# Patient Record
Sex: MALE | Race: OTHER | NOT HISPANIC OR LATINO
[De-identification: names, ages, dates, MRNs, and addresses within clinical notes are randomized per-mention and may not be internally consistent; named-entity substitution may affect disease eponyms.]

---

## 2023-04-24 PROBLEM — Z00.00 ENCOUNTER FOR PREVENTIVE HEALTH EXAMINATION: Status: ACTIVE | Noted: 2023-04-24

## 2023-04-25 ENCOUNTER — APPOINTMENT (OUTPATIENT)
Dept: OTOLARYNGOLOGY | Facility: CLINIC | Age: 28
End: 2023-04-25

## 2023-05-02 ENCOUNTER — APPOINTMENT (OUTPATIENT)
Dept: OTOLARYNGOLOGY | Facility: CLINIC | Age: 28
End: 2023-05-02
Payer: COMMERCIAL

## 2023-05-02 VITALS
SYSTOLIC BLOOD PRESSURE: 124 MMHG | TEMPERATURE: 97.9 F | BODY MASS INDEX: 25.2 KG/M2 | HEIGHT: 71 IN | OXYGEN SATURATION: 98 % | WEIGHT: 180 LBS | DIASTOLIC BLOOD PRESSURE: 75 MMHG | HEART RATE: 44 BPM

## 2023-05-02 PROCEDURE — 99203 OFFICE O/P NEW LOW 30 MIN: CPT | Mod: 25

## 2023-05-02 PROCEDURE — 31575 DIAGNOSTIC LARYNGOSCOPY: CPT | Mod: 52

## 2023-05-09 NOTE — HISTORY OF PRESENT ILLNESS
[de-identified] : 27 years old male patient with history of Persistens throat pain and throat discomfort since childhood.  History of Left tonsillar Abscess.  Patient is present today with Chronic tonsillitis > on the left side.  Bilateral tonsil hypertrophy.  Bilateral deep cryptic tonsil.  Lingual Tonsil Hypertrophy.  Turbinate Hypertrophy.  Deviated Nasal SEptum.  Septal Spur

## 2023-05-09 NOTE — REASON FOR VISIT
[Initial Evaluation] : an initial evaluation for [FreeTextEntry2] : Persistens throat pain and throat discomfort since childhood.  History of Left tonsillar Abscess.  Patient states her level of severity is a level 10 out of 10 and it occurs constant.  Patient states nothing helps to improve or worsens his Persistens throat pain and throat discomfort since childhood.  History of Left tonsillar Abscess

## 2023-05-09 NOTE — PHYSICAL EXAM
[Normal] : tympanic membranes are normal in both ears [de-identified] : Hypertrophy.  Turbinate Hypertrophy.  Deviated Nasal SEptum.  Septal Spur [de-identified] : Bilateral tonsil hypertrophy.  Bilateral deep cryptic tonsil.  Lingual Tonsil Hypertrophy.

## 2023-05-09 NOTE — CONSULT LETTER
[Dear  ___] : Dear  [unfilled], [Consult Letter:] : I had the pleasure of evaluating your patient, [unfilled]. [Please see my note below.] : Please see my note below. [Consult Closing:] : Thank you very much for allowing me to participate in the care of this patient.  If you have any questions, please do not hesitate to contact me. [Sincerely,] : Sincerely, [FreeTextEntry3] : Yvan Estrada MD, FACS\par Professor of Otolaryngology, [employer] at [facility]\par Director, [facility], Department of Otolaryngology, [facility]\par , Head & Neck Service Line, [facility]\par

## 2023-05-09 NOTE — PROCEDURE
[Congested] : congested [Deviated to the Lt] : deviated to the left [Image(s) Captured] : image(s) captured and filed [Topical Lidocaine] : topical lidocaine [Oxymetazoline HCl] : oxymetazoline HCl [Flexible Endoscope] : examined with the flexible endoscope [Serial Number: ___] : Serial Number: [unfilled] [de-identified] : Chronic tonsillitis > on the left side.  Bilateral tonsil hypertrophy.  Bilateral deep cryptic tonsil.  Lingual Tonsil Hypertrophy.  Turbinate Hypertrophy.  Deviated Nasal SEptum.  Septal Spur

## 2023-05-17 ENCOUNTER — TRANSCRIPTION ENCOUNTER (OUTPATIENT)
Age: 28
End: 2023-05-17

## 2023-05-17 RX ORDER — METHYLPREDNISOLONE 4 MG/1
4 TABLET ORAL
Qty: 1 | Refills: 0 | Status: ACTIVE | COMMUNITY
Start: 2023-05-17 | End: 1900-01-01

## 2023-05-17 RX ORDER — LIDOCAINE HYDROCHLORIDE 20 MG/ML
2 SOLUTION ORAL; TOPICAL
Qty: 1 | Refills: 4 | Status: ACTIVE | COMMUNITY
Start: 2023-05-17 | End: 1900-01-01

## 2023-05-17 RX ORDER — ACETAMINOPHEN AND CODEINE PHOSPHATE 300; 30 MG/1; MG/1
300-30 TABLET ORAL
Qty: 25 | Refills: 0 | Status: ACTIVE | COMMUNITY
Start: 2023-05-17 | End: 1900-01-01

## 2023-05-17 RX ORDER — DOCUSATE SODIUM 100 MG/1
100 CAPSULE ORAL TWICE DAILY
Qty: 60 | Refills: 0 | Status: ACTIVE | COMMUNITY
Start: 2023-05-17 | End: 1900-01-01

## 2023-05-17 RX ORDER — AMOXICILLIN 500 MG/1
500 CAPSULE ORAL
Qty: 14 | Refills: 0 | Status: ACTIVE | COMMUNITY
Start: 2023-05-17 | End: 1900-01-01

## 2023-05-17 NOTE — ASU PATIENT PROFILE, ADULT - FALL HARM RISK - UNIVERSAL INTERVENTIONS
Bed in lowest position, wheels locked, appropriate side rails in place/Call bell, personal items and telephone in reach/Instruct patient to call for assistance before getting out of bed or chair/Non-slip footwear when patient is out of bed/[place] to call system/Physically safe environment - no spills, clutter or unnecessary equipment/Purposeful Proactive Rounding/Room/bathroom lighting operational, light cord in reach

## 2023-05-17 NOTE — ASU PATIENT PROFILE, ADULT - NS PREOP UNDERSTANDS INFO
No solid food or milk products after 12 mid-night 5/17/2023/ water no later than 6am DOS/ photo ID and insurance card/ comfortable clothing/ mother will take him home/yes

## 2023-05-18 ENCOUNTER — OUTPATIENT (OUTPATIENT)
Dept: OUTPATIENT SERVICES | Facility: HOSPITAL | Age: 28
LOS: 1 days | Discharge: HOME | End: 2023-05-18
Payer: COMMERCIAL

## 2023-05-18 ENCOUNTER — TRANSCRIPTION ENCOUNTER (OUTPATIENT)
Age: 28
End: 2023-05-18

## 2023-05-18 ENCOUNTER — RESULT REVIEW (OUTPATIENT)
Age: 28
End: 2023-05-18

## 2023-05-18 ENCOUNTER — APPOINTMENT (OUTPATIENT)
Dept: OTOLARYNGOLOGY | Facility: AMBULATORY SURGERY CENTER | Age: 28
End: 2023-05-18

## 2023-05-18 VITALS
DIASTOLIC BLOOD PRESSURE: 73 MMHG | HEART RATE: 62 BPM | SYSTOLIC BLOOD PRESSURE: 104 MMHG | TEMPERATURE: 97 F | HEIGHT: 71 IN | RESPIRATION RATE: 16 BRPM | WEIGHT: 174.83 LBS

## 2023-05-18 VITALS
RESPIRATION RATE: 16 BRPM | DIASTOLIC BLOOD PRESSURE: 71 MMHG | TEMPERATURE: 98 F | SYSTOLIC BLOOD PRESSURE: 116 MMHG | HEART RATE: 89 BPM | OXYGEN SATURATION: 100 %

## 2023-05-18 PROCEDURE — 88304 TISSUE EXAM BY PATHOLOGIST: CPT | Mod: 26

## 2023-05-18 PROCEDURE — 88300 SURGICAL PATH GROSS: CPT | Mod: 26,59

## 2023-05-18 PROCEDURE — 30520 REPAIR OF NASAL SEPTUM: CPT

## 2023-05-18 PROCEDURE — 42870 EXCISION OF LINGUAL TONSIL: CPT

## 2023-05-18 PROCEDURE — 30802 ABLATE INF TURBINATE SUBMUC: CPT

## 2023-05-18 DEVICE — LASER EZTIP E4-100MM: Type: IMPLANTABLE DEVICE | Status: FUNCTIONAL

## 2023-05-18 RX ORDER — OXYCODONE HYDROCHLORIDE 5 MG/1
5 TABLET ORAL ONCE
Refills: 0 | Status: DISCONTINUED | OUTPATIENT
Start: 2023-05-18 | End: 2023-05-18

## 2023-05-18 RX ORDER — ACETAMINOPHEN 500 MG
1000 TABLET ORAL ONCE
Refills: 0 | Status: COMPLETED | OUTPATIENT
Start: 2023-05-18 | End: 2023-05-18

## 2023-05-18 RX ORDER — SODIUM CHLORIDE 9 MG/ML
1000 INJECTION, SOLUTION INTRAVENOUS
Refills: 0 | Status: DISCONTINUED | OUTPATIENT
Start: 2023-05-18 | End: 2023-05-18

## 2023-05-18 RX ORDER — HYDROXYZINE HCL 10 MG
2 TABLET ORAL
Refills: 0 | DISCHARGE

## 2023-05-18 RX ORDER — FENTANYL CITRATE 50 UG/ML
25 INJECTION INTRAVENOUS
Refills: 0 | Status: DISCONTINUED | OUTPATIENT
Start: 2023-05-18 | End: 2023-05-18

## 2023-05-18 RX ORDER — SUCRALFATE 1 G
1 TABLET ORAL ONCE
Refills: 0 | Status: COMPLETED | OUTPATIENT
Start: 2023-05-18 | End: 2023-05-18

## 2023-05-18 RX ORDER — BENZOCAINE AND MENTHOL 5; 1 G/100ML; G/100ML
1 LIQUID ORAL ONCE
Refills: 0 | Status: COMPLETED | OUTPATIENT
Start: 2023-05-18 | End: 2023-05-18

## 2023-05-18 RX ORDER — LIDOCAINE 4 G/100G
10 CREAM TOPICAL ONCE
Refills: 0 | Status: COMPLETED | OUTPATIENT
Start: 2023-05-18 | End: 2023-05-18

## 2023-05-18 RX ADMIN — Medication 1 GRAM(S): at 12:00

## 2023-05-18 RX ADMIN — Medication 1000 MILLIGRAM(S): at 16:50

## 2023-05-18 RX ADMIN — OXYCODONE HYDROCHLORIDE 5 MILLIGRAM(S): 5 TABLET ORAL at 15:45

## 2023-05-18 RX ADMIN — BENZOCAINE AND MENTHOL 1 LOZENGE: 5; 1 LIQUID ORAL at 12:00

## 2023-05-18 RX ADMIN — OXYCODONE HYDROCHLORIDE 5 MILLIGRAM(S): 5 TABLET ORAL at 12:44

## 2023-05-18 RX ADMIN — LIDOCAINE 10 MILLILITER(S): 4 CREAM TOPICAL at 13:55

## 2023-05-18 RX ADMIN — FENTANYL CITRATE 25 MICROGRAM(S): 50 INJECTION INTRAVENOUS at 11:27

## 2023-05-18 RX ADMIN — OXYCODONE HYDROCHLORIDE 5 MILLIGRAM(S): 5 TABLET ORAL at 15:15

## 2023-05-18 RX ADMIN — FENTANYL CITRATE 25 MICROGRAM(S): 50 INJECTION INTRAVENOUS at 11:33

## 2023-05-18 RX ADMIN — Medication 400 MILLIGRAM(S): at 16:20

## 2023-05-18 RX ADMIN — OXYCODONE HYDROCHLORIDE 5 MILLIGRAM(S): 5 TABLET ORAL at 13:15

## 2023-05-18 RX ADMIN — SODIUM CHLORIDE 100 MILLILITER(S): 9 INJECTION, SOLUTION INTRAVENOUS at 12:44

## 2023-05-18 NOTE — ASU DISCHARGE PLAN (ADULT/PEDIATRIC) - MEDICATION INSTRUCTIONS
Start all medications on Friday. [employer] Sinus Rinse Kit / start rinse on Saturday. Rinse only once a day. Rinse only the mornings.

## 2023-05-18 NOTE — ASU DISCHARGE PLAN (ADULT/PEDIATRIC) - PROCEDURE
Tonsillotomy, Lingual Tonsil Ablation, Open SMR, Resection of Nasal Septum. Turbinate Reduction, Laser Assisted  CO2, Diode Laser

## 2023-05-18 NOTE — ASU DISCHARGE PLAN (ADULT/PEDIATRIC) - ASU DC SPECIAL INSTRUCTIONSFT
Do not blow your nose for 2 weeks after surgery. Avoid lifting > 5 lbs. and no vigorous exercise for 2 weeks after surgery.    Bloody nasal drainage is normal after this surgery for 5-7 days, usually decreasing in volume with each day that passes. Drainage will flow from the front of the nose and down the back of the throat    Post-Operative Nutritional Suggestions:    Avoid acidic, spicy, hard, or crunchy foods that may cause pain or bleeding, sodas, and caffeinated drinks for approximately 2 WEEKS AFTER SURGERY.

## 2023-05-18 NOTE — BRIEF OPERATIVE NOTE - NSICDXBRIEFPROCEDURE_GEN_ALL_CORE_FT
PROCEDURES:  Tonsillotomy, adult 18-May-2023 09:55:44  Yvan Estrada  Laser ablation of lingual tonsils 18-May-2023 09:56:14  Yvan Estrada  SMR (submucous resection) 18-May-2023 09:56:24  Yvan Estrada  Nasal septum repair 18-May-2023 09:56:30  Yvan Estrada  Ablation, submucosal soft tissue, inferior nasal turbinate 18-May-2023 09:56:38  Yvan Estrada

## 2023-05-18 NOTE — BRIEF OPERATIVE NOTE - NSICDXBRIEFPREOP_GEN_ALL_CORE_FT
PRE-OP DIAGNOSIS:  Chronic cryptitis of tonsil 18-May-2023 09:56:57  Yvan Estrada  DNS (deviated nasal septum) 18-May-2023 09:57:03  Yvan Estrada  Hypertrophy of both inferior nasal turbinates 18-May-2023 09:57:09  Yvan Estrada  Nasal septal spur 18-May-2023 09:57:28  Yvan Estrada  Nasal obstruction 18-May-2023 09:57:38  Yvan Estrada  Hypertrophy of lingual tonsil 18-May-2023 09:59:08  Yvan Estrada

## 2023-05-18 NOTE — ASU DISCHARGE PLAN (ADULT/PEDIATRIC) - NURSING INSTRUCTIONS
Do not blow your nose for 2 weeks after surgery. Avoid lifting > 5 lbs. and no vigorous exercise for 2 weeks after surgery.    Bloody nasal drainage is normal after this surgery for 5-7 days, usually decreasing in volume with each day that passes. Drainage will flow from the front of the nose and down the back of the throat    . Post-Operative Nutritional Suggestions:    Avoid acidic, spicy, hard, or crunchy foods that may cause pain or bleeding, sodas, and caffeinated drinks for approximately 2 WEEKS AFTER SURGERY.

## 2023-05-18 NOTE — ASU DISCHARGE PLAN (ADULT/PEDIATRIC) - CALL YOUR DOCTOR IF YOU HAVE ANY OF THE FOLLOWING:
Bleeding that does not stop/Swelling that gets worse/Pain not relieved by Medications/Fever greater than (need to indicate Fahrenheit or Celsius)/Numbness, tingling, color or temperature change to extremity/Nausea and vomiting that does not stop/Unable to urinate

## 2023-05-18 NOTE — PRE-ANESTHESIA EVALUATION ADULT - NSANTHPEFT_GEN_ALL_CORE
Normal vision: sees adequately in most situations; can see medication labels, newsprint nad ctab rrr

## 2023-05-18 NOTE — BRIEF OPERATIVE NOTE - COMMENTS
Please schedule an appointment to see Dr. Yvan Estrada in the outpatient department. (His Private Practice)

## 2023-05-18 NOTE — BRIEF OPERATIVE NOTE - NSICDXBRIEFPOSTOP_GEN_ALL_CORE_FT
POST-OP DIAGNOSIS:  Cryptic tonsil 18-May-2023 09:57:58  Yvan Estrada  Hypertrophy of lingual tonsil 18-May-2023 09:58:06  Yvan Estrada  DNS (deviated nasal septum) 18-May-2023 09:58:16  Yvan Estrada  Hypertrophy of both inferior nasal turbinates 18-May-2023 09:58:33  Yvan Estrada  Nasal septal spur 18-May-2023 09:58:44  Yvan Estrada

## 2023-05-18 NOTE — ASU DISCHARGE PLAN (ADULT/PEDIATRIC) - CARE PROVIDER_API CALL
Yvan Estrada)  Otolaryngology  [address], [address]  [place], [place] [address]  Phone: [phone]  Fax: [phone]  Established Patient  Follow Up Time: 1 week

## 2023-05-22 PROBLEM — J30.2 OTHER SEASONAL ALLERGIC RHINITIS: Chronic | Status: ACTIVE | Noted: 2023-05-18

## 2023-05-22 PROBLEM — R01.1 CARDIAC MURMUR, UNSPECIFIED: Chronic | Status: ACTIVE | Noted: 2023-05-18

## 2023-05-24 ENCOUNTER — APPOINTMENT (OUTPATIENT)
Dept: OTOLARYNGOLOGY | Facility: CLINIC | Age: 28
End: 2023-05-24
Payer: COMMERCIAL

## 2023-05-24 DIAGNOSIS — J34.2 DEVIATED NASAL SEPTUM: ICD-10-CM

## 2023-05-24 PROCEDURE — 99024 POSTOP FOLLOW-UP VISIT: CPT

## 2023-05-24 PROCEDURE — 31237 NSL/SINS NDSC SURG BX POLYPC: CPT | Mod: 50,58

## 2023-05-24 RX ORDER — IBUPROFEN 800 MG/1
800 TABLET, FILM COATED ORAL 3 TIMES DAILY
Qty: 1 | Refills: 0 | Status: ACTIVE | COMMUNITY
Start: 2023-05-24 | End: 1900-01-01

## 2023-05-24 NOTE — PROCEDURE
[Debridement] : debridement  [Bilateral] : bilateral debridement of the nasal cavity [Severe] : severe [Pascual] : on both sides [Removed] : which was removed

## 2023-05-24 NOTE — HISTORY OF PRESENT ILLNESS
[de-identified] : 27 years old male patient with history of status post LAIT, Lingual Tonsil Ablation , Open SMR, Turbinate Reduction, Ablation of Nasal Septum Obstruction.   Patient is present today in the office for oral care and nasal debridement.  Patient  was encouraged to increase oral hydration. Bacitracin ointment was inserted into nostrils for lubrication.   Patient is healing according to plan.

## 2023-05-24 NOTE — PHYSICAL EXAM
[Normal] : tympanic membranes are normal in both ears [de-identified] : Hypertrophy.  Turbinate Hypertrophy.  Deviated Nasal SEptum.  Septal Spur [de-identified] : Bilateral tonsil hypertrophy.  Bilateral deep cryptic tonsil.  Lingual Tonsil Hypertrophy.

## 2023-05-24 NOTE — REASON FOR VISIT
[Post-Operative Visit] : a post-operative visit [Parent] : parent [FreeTextEntry2] : status post LAIT, Lingual Tonsil Ablation , Open SMR, Turbinate Reduction, Ablation of Nasal Septum Obstruction.

## 2023-05-25 ENCOUNTER — APPOINTMENT (OUTPATIENT)
Dept: OTOLARYNGOLOGY | Facility: CLINIC | Age: 28
End: 2023-05-25

## 2023-06-01 LAB — SURGICAL PATHOLOGY STUDY: SIGNIFICANT CHANGE UP

## 2023-06-14 ENCOUNTER — APPOINTMENT (OUTPATIENT)
Dept: OTOLARYNGOLOGY | Facility: CLINIC | Age: 28
End: 2023-06-14
Payer: COMMERCIAL

## 2023-06-14 VITALS
WEIGHT: 180 LBS | DIASTOLIC BLOOD PRESSURE: 77 MMHG | HEIGHT: 71 IN | TEMPERATURE: 98.2 F | HEART RATE: 67 BPM | SYSTOLIC BLOOD PRESSURE: 121 MMHG | OXYGEN SATURATION: 99 % | BODY MASS INDEX: 25.2 KG/M2

## 2023-06-14 DIAGNOSIS — J35.1 HYPERTROPHY OF TONSILS: ICD-10-CM

## 2023-06-14 DIAGNOSIS — J35.01 CHRONIC TONSILLITIS: ICD-10-CM

## 2023-06-14 DIAGNOSIS — R07.0 PAIN IN THROAT: ICD-10-CM

## 2023-06-14 DIAGNOSIS — J36 PERITONSILLAR ABSCESS: ICD-10-CM

## 2023-06-14 PROCEDURE — 99024 POSTOP FOLLOW-UP VISIT: CPT

## 2023-06-14 PROCEDURE — 31237 NSL/SINS NDSC SURG BX POLYPC: CPT | Mod: 50,58

## 2023-06-14 NOTE — PROCEDURE
[Debridement] : debridement  [Congested] : congested [Bilateral] : bilateral debridement of the nasal cavity [Moderate] : moderate [Pascual] : on both sides [Removed] : which was removed

## 2023-06-14 NOTE — HISTORY OF PRESENT ILLNESS
[de-identified] : 27 years old male patient with history of status post LAIT, Lingual Tonsil Ablation , Open SMR, Turbinate Reduction, Ablation of Nasal Septum Obstruction.   Patient is present today in the office for oral care and nasal debridement.  Patient  was encouraged to increase oral hydration. Bacitracin ointment was inserted into nostrils for lubrication.   Patient is healing according to plan.

## 2023-06-14 NOTE — PHYSICAL EXAM
[Normal] : no rashes [de-identified] : Hypertrophy.  Turbinate Hypertrophy.  Deviated Nasal SEptum.  Septal Spur [de-identified] : Bilateral tonsil hypertrophy.  Bilateral deep cryptic tonsil.  Lingual Tonsil Hypertrophy.

## (undated) DEVICE — DRAPE TOWEL BLUE 17" X 24"

## (undated) DEVICE — PETRI DISH MED 3.5"

## (undated) DEVICE — ELCTR BOVIE SUCTION 8FR 6"

## (undated) DEVICE — WARMING BLANKET LOWER ADULT

## (undated) DEVICE — Device

## (undated) DEVICE — SLV COMPRESSION KNEE MED

## (undated) DEVICE — SUT CHROMIC 2-0 27" CT-2

## (undated) DEVICE — COTTONBALL LG

## (undated) DEVICE — GLV 6.5 PROTEXIS W HYDROGEL

## (undated) DEVICE — PACK TONSIL ADENOID

## (undated) DEVICE — GLV 7.5 PROTEXIS (WHITE)

## (undated) DEVICE — GOWN ROYAL SILK XL

## (undated) DEVICE — SOL ANTI FOG

## (undated) DEVICE — TUBING RAPIDVAC SMOKE EVACUATOR .25" X 10FT